# Patient Record
Sex: MALE | Race: WHITE | NOT HISPANIC OR LATINO | URBAN - METROPOLITAN AREA
[De-identification: names, ages, dates, MRNs, and addresses within clinical notes are randomized per-mention and may not be internally consistent; named-entity substitution may affect disease eponyms.]

---

## 2017-06-07 ENCOUNTER — EMERGENCY (EMERGENCY)
Facility: HOSPITAL | Age: 22
LOS: 0 days | Discharge: HOME | End: 2017-06-07

## 2018-06-25 ENCOUNTER — EMERGENCY (EMERGENCY)
Facility: HOSPITAL | Age: 23
LOS: 0 days | Discharge: HOME | End: 2018-06-25
Attending: EMERGENCY MEDICINE | Admitting: EMERGENCY MEDICINE

## 2018-06-25 VITALS
DIASTOLIC BLOOD PRESSURE: 74 MMHG | SYSTOLIC BLOOD PRESSURE: 140 MMHG | OXYGEN SATURATION: 97 % | HEART RATE: 80 BPM | RESPIRATION RATE: 18 BRPM | TEMPERATURE: 97 F

## 2018-06-25 DIAGNOSIS — R21 RASH AND OTHER NONSPECIFIC SKIN ERUPTION: ICD-10-CM

## 2018-06-25 NOTE — ED PROVIDER NOTE - PHYSICAL EXAMINATION
GEN: Well appearing, in no apparent distress.    HEAD:  Normocephalic, atraumatic.    EYES:  Clear conjunctivae without injection, drainage or discharge.    NECK:  Supple, no masses. Normal ROM.    CARDIAC:  RRR, normal S1 and S2, no murmurs, rubs or gallops.    RESP:  Respiratory rate and effort appear normal; lungs are clear to auscultation bilaterally; no rhonchi, rales or wheezes.    ABDOMEN:  Soft, non-tender, non-distended, no masses. Normal BS throughout.    MUSCULOSKELETAL: No leg swelling, no calf tenderness. Patient able to ambulate without difficulty.  NEURO:  AAO x 3. Motor 5/5.     SKIN:  Well-perfused; warm and dry. (+) circular raised scaly lesions on chest and back non pruitic nonpainful.

## 2018-06-25 NOTE — ED PROVIDER NOTE - NS ED ROS FT
Constitutional: No fevers/chills.    Head: No injury, headache.    Eyes:  No visual changes, eye pain or discharge. No injury.    ENMT:  No hearing changes, pain, discharge or infections. No neck pain or stiffness. No loss ROM.    Cardiac:  No chest pain, SOB or edema. No chest pain with exertion.    Respiratory:  No cough or respiratory distress.     GI:  No nausea, vomiting, diarrhea or abdominal pain. No anorexia. No change in PO intake.    :  No frequency, urgency or burning. No change in urine output.    MS:  No leg pain, leg swelling, myalgia, muscle weakness, joint pain or back pain. No loss ROM.    Neuro:  No dizziness or weakness.  No LOC.    Skin:  (+) skin rash.

## 2018-06-25 NOTE — ED PROVIDER NOTE - OBJECTIVE STATEMENT
22 yo male with no significant PMHx presents for rash on chest and back x 1 month. Patient states he woke up with rash starting on chest and now is on his back. Rash is nonpruritic, non painful no drainage or bleeding. Patient denies fevers, chest pain, SOB, abdominal pain, nausea, vomiting, dizziness, weakness, headache, recent travel, leg pain/swelling, changes in PO intake, changes in urine output, changes in mental status.

## 2018-06-25 NOTE — ED PROVIDER NOTE - PROGRESS NOTE DETAILS
ATTENDING NOTE: 23-year-old male no significant past medical history presents for evaluation of rash. Patient states that he developed a small erythematous macular rash on his abdomen approximately 1 month ago, since then he has developed several more similar lesions across his abdomen, at the same time he had been experiencing URI symptoms, the rash has been constant but the URI symptoms resolved, He states that Last week the rash progressed to his back, he does report recent travel to [place], but none of the people with similar symptoms, denies associated pruritus, pain, fever. Pt lives with others, shares bed, no other household members with similar complaints. Denies new medications, environmental exposures. VSS, awake, alert, non-toxic appearing, sitting comfortably, in NAD, normal conjunctiva, oropharynx clear, no stridor, drooling, swelling, mobilizing secretions, chest CTAB, +S1/S2, RRR, abdomen soft, NT, isolated, circumscribed, erythematous, scattered,mildly scaling lesions noted to abdomen, Each measuring approximately 4-6 cm,On the patient's back there are slightly raised, ovoid, erythematous, coalescing lesions with slight scaling, Covering nearly the entirety of the patient's back, oropharynx clear, mmm, no JVD or bruit, no skin rash or lesions, chest CTAB, non-labored breathing, no w/r/r, +S1/S2, RRR, no m/r/g, abdomen soft, NT, ND, +BS, no peripheral edema or deformities, alert, clear speech and steady gait. Case discussed with patient and his mother, no red flags present, suspect pityriasis which is a self-limited illness, since patient has no symptoms at present recommend no therapy, outpatient follow-up with dermatology for further evaluation and management, patient is amenable to this plan. Prior to d/c the patient was advised to return to the emergency department if any new symptoms developed, symptoms worsened or for any concerns. The patient was offered the opportunity to ask questions and verbalized that they understand the diagnosis and discharge instructions.

## 2023-12-04 ENCOUNTER — EMERGENCY (EMERGENCY)
Facility: HOSPITAL | Age: 28
LOS: 0 days | Discharge: HOME | End: 2023-12-04
Attending: EMERGENCY MEDICINE
Payer: SELF-PAY

## 2023-12-04 ENCOUNTER — OUTPATIENT (OUTPATIENT)
Dept: OUTPATIENT SERVICES | Facility: HOSPITAL | Age: 28
LOS: 1 days | End: 2023-12-04
Payer: SELF-PAY

## 2023-12-04 VITALS
WEIGHT: 250 LBS | OXYGEN SATURATION: 96 % | HEART RATE: 79 BPM | RESPIRATION RATE: 17 BRPM | SYSTOLIC BLOOD PRESSURE: 142 MMHG | DIASTOLIC BLOOD PRESSURE: 86 MMHG | TEMPERATURE: 98 F

## 2023-12-04 DIAGNOSIS — K03.81 CRACKED TOOTH: ICD-10-CM

## 2023-12-04 DIAGNOSIS — K08.89 OTHER SPECIFIED DISORDERS OF TEETH AND SUPPORTING STRUCTURES: ICD-10-CM

## 2023-12-04 DIAGNOSIS — K02.9 DENTAL CARIES, UNSPECIFIED: ICD-10-CM

## 2023-12-04 PROCEDURE — D7140: CPT

## 2023-12-04 PROCEDURE — 99284 EMERGENCY DEPT VISIT MOD MDM: CPT

## 2023-12-04 PROCEDURE — D0140: CPT

## 2023-12-04 PROCEDURE — D0220: CPT

## 2023-12-04 PROCEDURE — 99282 EMERGENCY DEPT VISIT SF MDM: CPT

## 2023-12-04 NOTE — ED ADULT NURSE NOTE - NSFALLRISKASMTTYPE_ED_ALL_ED
FAMILY HISTORY:  No pertinent family history in first degree relatives     FAMILY HISTORY:  No pertinent family history in first degree relatives     Initial (On Arrival)

## 2023-12-04 NOTE — ED PROVIDER NOTE - PATIENT PORTAL LINK FT
You can access the FollowMyHealth Patient Portal offered by [facility] by registering at the following website: [email or web address]. By joining [employer]’s FollowMyHealth portal, you will also be able to view your health information using other applications (apps) compatible with our system. You can access the FollowMyHealth Patient Portal offered by [facility] by registering at the following website: [email or web address]. By joining [employer]’s FollowMyHealth portal, you will also be able to view your health information using other applications (apps) compatible with our system.

## 2023-12-04 NOTE — ED PROVIDER NOTE - OBJECTIVE STATEMENT
28-year-old male with no significant past medical history presents with pain near #29.  States that he bit something about 1 month ago and cracked the crown of that tooth has not seen a dentist since.  2 to 3 days ago started to have pain and swelling in that area.  No fever, chills, neck pain, trouble breathing or swelling under the tongue.

## 2023-12-04 NOTE — ED ADULT NURSE NOTE - NSFALLUNIVINTERV_ED_ALL_ED
Bed/Stretcher in lowest position, wheels locked, appropriate side rails in place/Call bell, personal items and telephone in reach/Instruct patient to call for assistance before getting out of bed/chair/stretcher/Non-slip footwear applied when patient is off stretcher/[place] to call system/Physically safe environment - no spills, clutter or unnecessary equipment/Purposeful proactive rounding/Room/bathroom lighting operational, light cord in reach Bed/Stretcher in lowest position, wheels locked, appropriate side rails in place/Call bell, personal items and telephone in reach/Instruct patient to call for assistance before getting out of bed/chair/stretcher/Non-slip footwear applied when patient is off stretcher/[place] to call system/Physically safe environment - no spills, clutter or unnecessary equipment/Purposeful proactive rounding/Room/bathroom lighting operational, light cord in reach

## 2023-12-04 NOTE — ED PROVIDER NOTE - CLINICAL SUMMARY MEDICAL DECISION MAKING FREE TEXT BOX
Patient with likely abscess adjacent to root of #29.  Airway intact.  No signs of Ludwigs or airway concerns at this time.  Discussed with dental on-call and they recommend discharge and bring over to dental clinic now for management.

## 2023-12-04 NOTE — ED PROVIDER NOTE - PHYSICAL EXAMINATION
Nontoxic, vital signs noted, gingival swelling near #29 adjacent to fractured crown, no sublingual or submandibular swelling or tenderness, does have some mild tenderness along the external mandibular line in that area.  Oropharyngeal exam with no swelling or edema otherwise.  No stridor, airway intact.

## 2023-12-04 NOTE — ED PROVIDER NOTE - NSFOLLOWUPCLINICS_GEN_ALL_ED_FT
[facility]  Dental  [address] [place] [id number]  Phone: [phone]  Fax:   Follow Up Time: Urgent     [facility]  Dental  [address] [place] [id number]  Phone: [phone]  Fax:   Follow Up Time: Urgent

## 2023-12-04 NOTE — ED PROVIDER NOTE - NSFOLLOWUPINSTRUCTIONS_ED_ALL_ED_FT
Go to the dental clinic now.  They will evaluate you and determine how to best manage your swelling and likely abscess.  If they cannot see you, return to the ER.

## 2023-12-10 DIAGNOSIS — K02.9 DENTAL CARIES, UNSPECIFIED: ICD-10-CM
